# Patient Record
Sex: MALE | Race: WHITE
[De-identification: names, ages, dates, MRNs, and addresses within clinical notes are randomized per-mention and may not be internally consistent; named-entity substitution may affect disease eponyms.]

---

## 2018-10-23 LAB
BUN BLD-MCNC: 20 MG/DL (ref 7–18)
GLUCOSE SERPLBLD-MCNC: 80 MG/DL (ref 74–106)
HCT VFR BLD CALC: 41.6 % (ref 39.6–49)
INR BLD: 1.73
LYMPHOCYTES # SPEC AUTO: 2.5 K/UL (ref 0.7–4.9)
MCH RBC QN AUTO: 34.3 PG (ref 27–35)
MCV RBC: 99.3 FL (ref 80–100)
PMV BLD: 8.3 FL (ref 7.6–11.3)
POTASSIUM SERPL-SCNC: 4.2 MMOL/L (ref 3.5–5.1)
RBC # BLD: 4.19 M/UL (ref 4.33–5.43)

## 2018-10-24 ENCOUNTER — HOSPITAL ENCOUNTER (OUTPATIENT)
Dept: HOSPITAL 97 - CCL | Age: 63
Discharge: HOME | End: 2018-10-24
Attending: SPECIALIST
Payer: COMMERCIAL

## 2018-10-24 DIAGNOSIS — I48.91: Primary | ICD-10-CM

## 2018-10-24 DIAGNOSIS — I10: ICD-10-CM

## 2018-10-24 PROCEDURE — 85730 THROMBOPLASTIN TIME PARTIAL: CPT

## 2018-10-24 PROCEDURE — 85610 PROTHROMBIN TIME: CPT

## 2018-10-24 PROCEDURE — 93005 ELECTROCARDIOGRAM TRACING: CPT

## 2018-10-24 PROCEDURE — 36415 COLL VENOUS BLD VENIPUNCTURE: CPT

## 2018-10-24 PROCEDURE — 85025 COMPLETE CBC W/AUTO DIFF WBC: CPT

## 2018-10-24 PROCEDURE — 5A2204Z RESTORATION OF CARDIAC RHYTHM, SINGLE: ICD-10-PCS

## 2018-10-24 PROCEDURE — 92960 CARDIOVERSION ELECTRIC EXT: CPT

## 2018-10-24 PROCEDURE — 80048 BASIC METABOLIC PNL TOTAL CA: CPT

## 2018-10-24 NOTE — OP
Surgeon:  Luis F Tsai MD



Primary Care Physician:  Tee Stover MD



Procedure:  Direct current cardioversion.



Findings:  The patient was in AFib.  With a single shock, he reverted to sinus rhythm.



Procedure In Detail:  He was brought to the cardiac cath lab in a fasting state.  He had taken Xarelt
o more than 3 weeks.  He had taken Multaq for a week.  He was sedated with Versed.  Anterior-posterio
r paddles were applied to his chest.  A single shock was given, synchronized the QRS complex.  The rh
ythm after the shock was sinus rhythm.  No complications.





/MODL

DD:  10/24/2018 08:08:23Voice ID:  [id number]

DT:  10/24/2018 11:08:59Report ID:  [id number]

## 2018-10-24 NOTE — EKG
Test Date:    2018-10-24               Test Time:    08:06:57

Technician:   DARLIN                                   

                                                     

MEASUREMENT RESULTS:                                       

Intervals:                                           

Rate:         66                                     

TN:           190                                    

QRSD:         80                                     

QT:           458                                    

QTc:          480                                    

Axis:                                                

P:            42                                     

TN:           190                                    

QRS:          49                                     

T:            28                                     

                                                     

INTERPRETIVE STATEMENTS:                                       

                                                     

Normal sinus rhythm

Prolonged QT

Abnormal ECG

Compared to ECG 10/19/2016 08:31:04

Prolonged QT interval now present

Sinus tachycardia no longer present



Electronically Signed On 10-24-18 08:26:08 CDT by Luis F Tsai

## 2021-01-18 ENCOUNTER — HOSPITAL ENCOUNTER (EMERGENCY)
Dept: HOSPITAL 97 - ER | Age: 66
Discharge: ANOTHER HOSPITAL | End: 2021-01-18
Payer: COMMERCIAL

## 2021-01-18 VITALS — SYSTOLIC BLOOD PRESSURE: 129 MMHG | DIASTOLIC BLOOD PRESSURE: 70 MMHG

## 2021-01-18 VITALS — TEMPERATURE: 98.3 F | OXYGEN SATURATION: 98 %

## 2021-01-18 DIAGNOSIS — I10: ICD-10-CM

## 2021-01-18 DIAGNOSIS — Z20.822: ICD-10-CM

## 2021-01-18 DIAGNOSIS — G45.9: Primary | ICD-10-CM

## 2021-01-18 DIAGNOSIS — I48.91: ICD-10-CM

## 2021-01-18 DIAGNOSIS — Z79.01: ICD-10-CM

## 2021-01-18 DIAGNOSIS — R29.700: ICD-10-CM

## 2021-01-18 DIAGNOSIS — I65.22: ICD-10-CM

## 2021-01-18 LAB
BUN BLD-MCNC: 22 MG/DL (ref 7–18)
GLUCOSE SERPLBLD-MCNC: 202 MG/DL (ref 74–106)
HCT VFR BLD CALC: 44 % (ref 39.6–49)
INR BLD: 1.53
LYMPHOCYTES # SPEC AUTO: 2.8 K/UL (ref 0.7–4.9)
PMV BLD: 8.2 FL (ref 7.6–11.3)
POTASSIUM SERPL-SCNC: 3.4 MMOL/L (ref 3.5–5.1)
RBC # BLD: 4.51 M/UL (ref 4.33–5.43)

## 2021-01-18 PROCEDURE — 70450 CT HEAD/BRAIN W/O DYE: CPT

## 2021-01-18 PROCEDURE — 96365 THER/PROPH/DIAG IV INF INIT: CPT

## 2021-01-18 PROCEDURE — 84443 ASSAY THYROID STIM HORMONE: CPT

## 2021-01-18 PROCEDURE — 85610 PROTHROMBIN TIME: CPT

## 2021-01-18 PROCEDURE — 96366 THER/PROPH/DIAG IV INF ADDON: CPT

## 2021-01-18 PROCEDURE — 70496 CT ANGIOGRAPHY HEAD: CPT

## 2021-01-18 PROCEDURE — 82947 ASSAY GLUCOSE BLOOD QUANT: CPT

## 2021-01-18 PROCEDURE — 85730 THROMBOPLASTIN TIME PARTIAL: CPT

## 2021-01-18 PROCEDURE — 99284 EMERGENCY DEPT VISIT MOD MDM: CPT

## 2021-01-18 PROCEDURE — 81003 URINALYSIS AUTO W/O SCOPE: CPT

## 2021-01-18 PROCEDURE — 80048 BASIC METABOLIC PNL TOTAL CA: CPT

## 2021-01-18 PROCEDURE — 70498 CT ANGIOGRAPHY NECK: CPT

## 2021-01-18 PROCEDURE — 85025 COMPLETE CBC W/AUTO DIFF WBC: CPT

## 2021-01-18 PROCEDURE — 71045 X-RAY EXAM CHEST 1 VIEW: CPT

## 2021-01-18 PROCEDURE — 36415 COLL VENOUS BLD VENIPUNCTURE: CPT

## 2021-01-18 PROCEDURE — 93005 ELECTROCARDIOGRAM TRACING: CPT

## 2021-01-18 NOTE — ER
Nurse's Notes                                                                                     

 [facility]                                                                 

Name: Juan Lugo                                                                             

Age: 65 yrs                                                                                       

Sex: Male                                                                                         

: 1955                                                                                   

MRN: [id number]                                                                                   

Arrival Date: 2021                                                                          

Time: 15:50                                                                                       

Account#: [id number]                                                                            

Bed 8                                                                                             

Private MD:                                                                                       

Diagnosis: Transient cerebral ischemic attack, unspecified;Occlusion and stenosis of carotid      

  artery-70 % bulb on the left                                                                    

                                                                                                  

Presentation:                                                                                     

                                                                                             

15:55 Chief complaint: Patient states: R arm weakness and numbness 30 - 45 minutes PTA.       ca1 

      Symptoms resolved at this time. Had headache off and on for 2 days and visual problems.     

      HX of A-fib. Pt on Xarelto. VAN negative. Negative slurring. Negative facial droop.         

      Onset of symptoms was 2021 at 15:10.                                            

15:55 Method Of Arrival: Ambulatory                                                           ca1 

15:55 Acuity: RACHAEL 2                                                                           ca1 

15:55 An acute neurological deficit is present. Pre-hospital glucose is not applicable to     aa5 

      this patient.                                                                               

16:10 Risk Assessment: Do you want to hurt yourself or someone else? Patient reports no       aa5 

      desire to harm self or others.                                                              

16:10 Coronavirus screen: Client denies travel out of the [place] in the last 14 days. At this   aa5 

      time, the client does not indicate any symptoms associated with coronavirus-19. Ebola       

      Screen: Patient negative for fever greater than or equal to 101.5 degrees Fahrenheit,       

      and additional compatible Ebola Virus Disease symptoms. Initial Sepsis Screen: Does the     

      patient meet any 2 criteria? No. Patient's initial sepsis screen is negative. Does the      

      patient have a suspected source of infection? No. Patient's initial sepsis screen is        

      negative.                                                                                   

                                                                                                  

Stroke Activation: Symptom onset < 3 hours                                                        

 Physician: Stroke Attending; Name: ; Notified At: ; Arrived At:                                  

 Physician: Chief Stroke Resident; Name: ; Notified At: ; Arrived At:                             

 Physician: Stroke Resident; Name: ; Notified At: ; Arrived At:                                   

 Physician: ED Attending; Name: ; Notified At: ; Arrived At:                                      

 Physician: ED Resident; Name: ; Notified At: ; Arrived At:                                       

                                                                                                  

Historical:                                                                                       

- Allergies:                                                                                      

16:10 No Known Allergies;                                                                     aa5 

- Home Meds:                                                                                      

19:17 Xarelto 20 mg oral tab 1 tab once daily [Active]; Rythmol 225 mg oral tab 1 tab twice a dm5 

      day [Active]; lisinopril-hydrochlorothiazide 20-12.5 mg oral tab 1 tab once daily           

      [Active]; atorvastatin 10 mg oral tab 1 tab once daily [Active];                            

      bisoprolol-hydrochlorothiazide 10-6.25 mg oral tab 1 tab once daily [Active];               

- PMHx:                                                                                           

16:10 Atrial Fib; Hypertension;                                                               aa5 

- PSHx:                                                                                           

16:10 Knee surgery;                                                                           aa5 

                                                                                                  

- Family history:: not pertinent.                                                                 

                                                                                                  

                                                                                                  

Screenin:10 Abuse screen: Denies threats or abuse. Nutritional screening: No deficits noted.        aa5 

      Tuberculosis screening: No symptoms or risk factors identified. Fall Risk None              

      identified.                                                                                 

                                                                                                  

Assessment:                                                                                       

16:01 Reassessment: Dr. Spaulding at bedside.                                                  ca1 

16:08 Reassessment: Pt back from CT scan .                                                    aa5 

16:10 VAN Scoring: Arm Drift: Patients demonstrates NO arm weakness. Patient is VAN Negative. aa5 

      Visual Disturbance: No visual disturbance noted. Aphasia: No aphasia noted. Neglect: No     

      neglect noted.                                                                              

16:10 General: Appears comfortable, Behavior is calm, cooperative. Pain: Denies pain. Neuro:  aa5 

      Level of Consciousness is awake, alert, obeys commands, Oriented to person, place,          

      time, situation, Appropriate for age  are equal bilaterally Moves all extremities.     

      Speech is normal, Facial symmetry appears normal, Pupils are PERRLA, Denies weakness        

      blurred vision dizziness, difficulty swallowing, paresthesias numbness headache             

      diplopia. Cardiovascular: Heart tones S1 S2 present. Respiratory: Airway is patent          

      Respiratory effort is even, unlabored, Respiratory pattern is regular, symmetrical,         

      Denies cough, shortness of breath. GI: No signs and/or symptoms were reported involving     

      the gastrointestinal system. Patient currently denies diarrhea, nausea, vomiting. :       

      No signs and/or symptoms were reported regarding the genitourinary system. EENT: No         

      signs and/or symptoms were reported regarding the EENT system. Derm: Skin is pink, warm     

      \T\ dry. Musculoskeletal: Range of motion: intact in all extremities.                       

16:10 T-PA (Activase) Screening: Contraindications: Rapidly improving condition or minor      aa5 

      deficit: Yes.                                                                               

16:20 Patient has been NPO before screening. The patient is alert, and able to follow         aa5 

      commands. The patient does not exhibit slurred or garbled speech. The patient is not        

      exhibiting difficulty speaking. The patient does not exhibit difficulty understanding       

      words. The patient is able to swallow own secretions with no drooling or need for           

      suction. Patient tolerated one teaspoon of water. No drooling, immediate coughing,          

      gurgling, or clearing of the throat was noted. The patient tolerated 90mL of water. No      

      drooling, immediate coughing, gurgling, or clearing of the throat was noted. The            

      patient passed the bedside swallow screening. Oral medications may be given as ordered.     

      Contact Physician for further diet orders. Provider notified of bedside swallow             

      screening results: Yaya Spaulding MD.                                                       

17:15 Reassessment: PT RETURNED FROM CT.                                                      bp  

17:15 Reassessment: Patient appears in no apparent distress at this time. No changes from     bp  

      previously documented assessment. Patient and/or family updated on plan of care and         

      expected duration. Pain level reassessed. Patient is alert, oriented x 3, equal             

      unlabored respirations, skin warm/dry/pink.                                                 

18:20 Reassessment: Patient is alert, oriented x 3, equal unlabored respirations, skin        aa5 

      warm/dry/pink. Patient denies pain at this time.                                            

20:18 Reassessment: attempt to call report to receiving nurse, awaiting admin approval for pt sg  

      transfer, Bianca RN with [facility] requesting 10 more minutes for a call back.          

21:11 Reassessment: Patient appears in no apparent distress at this time. Patient and/or      sg  

      family updated on plan of care and expected duration. Pain level reassessed. Patient is     

      alert, oriented x 3, equal unlabored respirations, skin warm/dry/pink. no call back         

      from transfer center, attempt to call them, [place] [facility] coordinator states that      

      she is awaiting a call back from the [facility] for patient placement at this time, awaiting       

      another call back for this patient transfer.                                                

21:21 Reassessment: Patient appears in no apparent distress at this time. Patient is alert,   sg  

      oriented x 3, equal unlabored respirations, skin warm/dry/pink. pt updated on transfer      

      status, placed to a hospital bed for comfort while awaiting pt transfer at this time.       

21:26 Reassessment: orders to hld pt at this facility until [facility] has patient          sg  

      placement her Laly RN, pt to remain in the facility until instructed to call report.      

      Charge Nurse aware.                                                                         

21:40 Reassessment: Report called to Shawanda OLIVIER for patient transfer, pt signatures obtained,   sg  

      awaiting EMS transport at this time, pt stated understanding.                               

                                                                                                  

Vital Signs:                                                                                      

16:10  / 81; Pulse 67; Resp 20 S; Temp 98.3(O); Pulse Ox 98% on R/A;                    aa5 

17:00  / 68; Pulse 68; Resp 18; Pulse Ox 98% ;                                          bp  

18:00  / 80; Pulse 58; Resp 16 S; Pulse Ox 98% on R/A;                                  aa5 

19:38  / 70; Pulse 56 MON; Resp 18; Temp 98.3; Pulse Ox 98% on R/A;                     sg  

                                                                                                  

NIH Stroke Scale Scores:                                                                          

16:10 NIHSS Score: 0                                                                          aa5 

16:43 NIHSS Score: 0                                                                          malia 

17:15 NIHSS Score: 0                                                                          bp  

18:20 NIHSS Score: 0                                                                          aa5 

                                                                                                  

ED Course:                                                                                        

15:50 Patient arrived in ED.                                                                  as  

15:53 Siena Nash, RN is Primary Nurse.                                                   aa5 

15:54 Yaya Spualding MD is Attending Physician.                                             malia 

15:57 Triage completed.                                                                       ca1 

16:00 CT Stroke Brain w/o Contrast In Process Unspecified.                                    EDMS

16:01 Arm band placed on right wrist.                                                         ca1 

16:10 Patient has correct armband on for positive identification. Placed in gown. Bed in low  mh5 

      position. Call light in reach. Side rails up X 1. Warm blanket given. Cardiac monitor       

      on. Pulse ox on. NIBP on.                                                                   

16:10 EKG done, by ED staff, reviewed by Yaya Spaulding MD.                                   mh5 

16:10 Initial lab(s) drawn, by me, sent to lab. Inserted saline lock: 18 gauge in right       aa5 

      antecubital area, using aseptic technique. Blood collected.                                 

16:11 Ptt, Activated Sent.                                                                    mh5 

16:11 Protime (+inr) Sent.                                                                    mh5 

16:11 CBC with Diff Sent.                                                                     mh5 

16:11 Basic Metabolic Panel Sent.                                                             mh5 

16:23 Stroke CXR 1 View In Process Unspecified.                                               EDMS

17:01 Shawn Mark DO is Hospitalizing Provider.                                           malia 

17:12 Head Angio CT In Process Unspecified.                                                   EDMS

17:13 Neck Angio CT In Process Unspecified.                                                   EDMS

19:00 Report given to Adama Shankar RN.                                                         aa5 

19:28 COVID swab sent to lab.                                                                   

19:39 Primary Nurse role handed off by Siena Nash, RN                                    sg  

19:39 Adama Shankar, RN is Primary Nurse.                                                       sg  

                                                                                                  

Administered Medications:                                                                         

16:18 Drug: NS 0.9% 1000 ml Route: IV; Rate: 1 bolus; Site: right antecubital;                aa5 

18:32 Follow up: IV Status: Completed infusion; IV Intake: 1000ml                             bp  

16:18 Drug: foLIC Acid 1 mg Route: IVPB; Site: right antecubital;                             aa5 

18:31 Follow up: IV Status: Completed infusion; IV Intake: 100ml                              bp  

17:30 Drug: Lipitor 20 mg Route: PO;                                                          aa5 

18:31 Follow up: Response: No adverse reaction                                                bp  

18:00 Drug: NS 0.9% with KCl 20 mEq/L 1000 ml Route: IV; Rate: 125 ml/hr; Site: right         bp  

      antecubital;                                                                                

20:00 Drug: Rythmol 225 mg Route: PO;                                                         dm5 

21:00 Follow up: Response: No adverse reaction                                                sg  

                                                                                                  

                                                                                                  

Point of Care Testing:                                                                            

      Blood Glucose:                                                                              

16:10 Blood Glucose: 205 mg/dL;                                                               aa5 

      Ranges:                                                                                     

                                                                                                  

Intake:                                                                                           

18:31 IV: 100ml; Total: 100ml.                                                                bp  

18:32 IV: 1000ml; Total: 1100ml.                                                              bp  

                                                                                                  

Outcome:                                                                                          

17:06 Decision to Hospitalize by Provider.                                                    malia 

17:56 ER care complete, transfer ordered by MD.                                               malia 

22:32 Patient left the ED.                                                                      

                                                                                                  

                                                                                                  

NIH Stroke Scale - NIH Stroke Score                                                               

Date: 2021                                                                                  

Time: 16:10                                                                                       

Total Score = 0                                                                                   

  1a. Level of Consciousness (LOC) - 0(Alert)                                                     

  1b. Level of Consciousness (LOC) (Year \T\ Age) - 0(Both)                                       

  1c. LOC Commands (Open \T\ Closes Eyes/) - 0(Both)                                          

   2. Best Gaze (Lateral Gaze Paresis) - 0(Normal)                                                

   3. Visual Field Loss - 0(No visual loss)                                                       

   4. Facial Palsy - 0(Normal)                                                                    

  5a. Left Arm: Motor (10-second hold) - 0(No drift)                                              

  5b. Right Arm: Motor (10-second hold) - 0(No drift)                                             

  6a. Left Leg: Motor (5-second hold - always test supine) - 0(No drift)                          

  6b. Right Leg: Motor (5-second hold - always test supine) - 0(No drift)                         

   7. Limb Ataxia (finger/nose \T\ heel/shin - test with eyes open) - 0(Absent)                   

   8. Sensory Loss (pinprick arms/legs/face) - 0(Normal)                                          

   9. Best Language: Aphasia (description/naming/reading) - 0(No aphasia)                         

  10. Dysarthria (speech clarity - read or repeat words) - 0(Normal)                              

  11. Extinction and Inattention (visual/tactile/auditory/spatial/personal) - 0(No                

      abnormality)                                                                                

Initials: aa5                                                                                     

                                                                                                  

                                                                                                  

NIH Stroke Scale - NIH Stroke Score                                                               

Date: 2021                                                                                  

Time: 16:43                                                                                       

Total Score = 0                                                                                   

  1a. Level of Consciousness (LOC) - 0(Alert)                                                     

  1b. Level of Consciousness (LOC) (Year \T\ Age) - 0(Both)                                       

  1c. LOC Commands (Open \T\ Closes Eyes/) - 0(Both)                                          

   2. Best Gaze (Lateral Gaze Paresis) - 0(Normal)                                                

   3. Visual Field Loss - 0(No visual loss)                                                       

   4. Facial Palsy - 0(Normal)                                                                    

  5a. Left Arm: Motor (10-second hold) - 0(No drift)                                              

  5b. Right Arm: Motor (10-second hold) - 0(No drift)                                             

  6a. Left Leg: Motor (5-second hold - always test supine) - 0(No drift)                          

  6b. Right Leg: Motor (5-second hold - always test supine) - 0(No drift)                         

   7. Limb Ataxia (finger/nose \T\ heel/shin - test with eyes open) - 0(Absent)                   

   8. Sensory Loss (pinprick arms/legs/face) - 0(Normal)                                          

   9. Best Language: Aphasia (description/naming/reading) - 0(No aphasia)                         

  10. Dysarthria (speech clarity - read or repeat words) - 0(Normal)                              

  11. Extinction and Inattention (visual/tactile/auditory/spatial/personal) - 0(No                

      abnormality)                                                                                

Initials: malia                                                                                     

                                                                                                  

                                                                                                  

NIH Stroke Scale - NIH Stroke Score                                                               

Date: 2021                                                                                  

Time: 17:15                                                                                       

Total Score = 0                                                                                   

  1a. Level of Consciousness (LOC) - 0(Alert)                                                     

  1b. Level of Consciousness (LOC) (Year \T\ Age) - 0(Both)                                       

  1c. LOC Commands (Open \T\ Closes Eyes/) - 0(Both)                                          

   2. Best Gaze (Lateral Gaze Paresis) - 0(Normal)                                                

   3. Visual Field Loss - 0(No visual loss)                                                       

   4. Facial Palsy - 0(Normal)                                                                    

  5a. Left Arm: Motor (10-second hold) - 0(No drift)                                              

  5b. Right Arm: Motor (10-second hold) - 0(No drift)                                             

  6a. Left Leg: Motor (5-second hold - always test supine) - 0(No drift)                          

  6b. Right Leg: Motor (5-second hold - always test supine) - 0(No drift)                         

   7. Limb Ataxia (finger/nose \T\ heel/shin - test with eyes open) - 0(Absent)                   

   8. Sensory Loss (pinprick arms/legs/face) - 0(Normal)                                          

   9. Best Language: Aphasia (description/naming/reading) - 0(No aphasia)                         

  10. Dysarthria (speech clarity - read or repeat words) - 0(Normal)                              

  11. Extinction and Inattention (visual/tactile/auditory/spatial/personal) - 0(No                

      abnormality)                                                                                

Initials: bp                                                                                      

                                                                                                  

                                                                                                  

NIH Stroke Scale - NIH Stroke Score                                                               

Date: 2021                                                                                  

Time: 18:20                                                                                       

Total Score = 0                                                                                   

  1a. Level of Consciousness (LOC) - 0(Alert)                                                     

  1b. Level of Consciousness (LOC) (Year \T\ Age) - 0(Both)                                       

  1c. LOC Commands (Open \T\ Closes Eyes/) - 0(Both)                                          

   2. Best Gaze (Lateral Gaze Paresis) - 0(Normal)                                                

   3. Visual Field Loss - 0(No visual loss)                                                       

   4. Facial Palsy - 0(Normal)                                                                    

  5a. Left Arm: Motor (10-second hold) - 0(No drift)                                              

  5b. Right Arm: Motor (10-second hold) - 0(No drift)                                             

  6a. Left Leg: Motor (5-second hold - always test supine) - 0(No drift)                          

  6b. Right Leg: Motor (5-second hold - always test supine) - 0(No drift)                         

   7. Limb Ataxia (finger/nose \T\ heel/shin - test with eyes open) - 0(Absent)                   

   8. Sensory Loss (pinprick arms/legs/face) - 0(Normal)                                          

   9. Best Language: Aphasia (description/naming/reading) - 0(No aphasia)                         

  10. Dysarthria (speech clarity - read or repeat words) - 0(Normal)                              

  11. Extinction and Inattention (visual/tactile/auditory/spatial/personal) - 0(No                

      abnormality)                                                                                

Initials: aa5                                                                                     

                                                                                                  

Signatures:                                                                                       

Dispatcher [employer]                           Zita Harp, RN                    RN   5                                                  

Adama Shankar RN RN sg Anderson, Corey, MD MD cha Martinez, Amelia as Calderon, Audri, RN RN   5                                                  

Jovani, Shana                              [facility]                                                  

Tee Holland RN RN   bp                                                   

AcobGilda RN                        RN   ca1                                                  

                                                                                                  

Corrections: (The following items were deleted from the chart)                                    

16:01 15:55 Chief complaint: Patient states: R arm weakness and numbness 30 - 45      ca1         

      minutes PTA. Symptoms resolved at this time. Had headache off and on for                    

      several days and visual problems. HX of A-fib. Pt on Xarelto. ca1                           

19:17 16:10 Home Meds: Xarelto oral oral; aa5                                         dm5         

19:17 16:10 Home Meds: Lisinopril Oral; aa5                                           dm5         

19:17 16:10 Home Meds: Rythmol Oral; aa5                                              dm5         

                                                                                                  

**************************************************************************************************

## 2021-01-18 NOTE — RAD REPORT
EXAM DESCRIPTION:  CT - Ct Stroke Brain Wo Cont - 1/18/2021 4:01 pm

 

CLINICAL HISTORY:  Numbness/right arm weakness

 

COMPARISON:  none

 

TECHNIQUE:  Computed axial tomography of the head was obtained.

 

All CT scans are performed using dose optimization technique as appropriate and may include automated
 exposure control or mA/KV adjustment according to patient size.

 

FINDINGS:  An intracranial  bleed is not seen .

 

The ventricles are normal in caliber.

 

No extra-axial fluid collection is noted.

 

Fluid within the sinuses/ mastoids is not seen.

 

IMPRESSION:  No acute intracranial abnormality is seen. If patient's symptoms persist  MRI of the bra
in would be recommended.

 

EVELIA Mandujano of the emergency room was notified at 4:04 p.m. January 18, 2021

## 2021-01-18 NOTE — EDPHYS
Physician Documentation                                                                           

 [facility]                                                                 

Name: Juan Lugo                                                                             

Age: 65 yrs                                                                                       

Sex: Male                                                                                         

: 1955                                                                                   

MRN: [id number]                                                                                   

Arrival Date: 2021                                                                          

Time: 15:50                                                                                       

Account#: [id number]                                                                            

Bed 8                                                                                             

Private MD:                                                                                       

ED Physician Yaya Spaulding                                                                      

HPI:                                                                                              

                                                                                             

16:38 This 65 yrs old  Male presents to ER via Ambulatory with complaints of S/S of  malia 

      Possible Stroke.                                                                            

16:38 The patient's problem is reported as a facial droop, paresthesias, weakness, in the     malia 

      right upper extremity. Onset: The symptoms/episode began/occurred 2 hour(s) ago.            

      Duration: The episodes are intermittent, lasting 90 second(s). Context: the episode(s)      

      was witnessed, by no one, symptoms became apparent at 15:00. The symptoms are               

      alleviated by nothing. The symptoms are aggravated by nothing. Associated signs and         

      symptoms: The patient has no apparent associated signs or symptoms. Severity of             

      symptoms: At their worst the symptoms were mild in the emergency department the             

      symptoms have improved markedly. Patient's baseline: Neuro: alert and fully oriented.       

                                                                                                  

Historical:                                                                                       

- Allergies:                                                                                      

16:10 No Known Allergies;                                                                     aa5 

- Home Meds:                                                                                      

19:17 Xarelto 20 mg oral tab 1 tab once daily [Active]; Rythmol 225 mg oral tab 1 tab twice a dm5 

      day [Active]; lisinopril-hydrochlorothiazide 20-12.5 mg oral tab 1 tab once daily           

      [Active]; atorvastatin 10 mg oral tab 1 tab once daily [Active];                            

      bisoprolol-hydrochlorothiazide 10-6.25 mg oral tab 1 tab once daily [Active];               

- PMHx:                                                                                           

16:10 Atrial Fib; Hypertension;                                                               aa5 

- PSHx:                                                                                           

16:10 Knee surgery;                                                                           aa5 

                                                                                                  

- Family history:: not pertinent.                                                                 

                                                                                                  

                                                                                                  

ROS:                                                                                              

16:38 Constitutional: Negative for fever, chills, and weight loss, Eyes: Negative for injury, malia 

      pain, redness, and discharge, ENT: Negative for injury, pain, and discharge, Neck:          

      Negative for injury, pain, and swelling, Cardiovascular: Negative for chest pain,           

      palpitations, and edema, Respiratory: Negative for shortness of breath, cough,              

      wheezing, and pleuritic chest pain, Abdomen/GI: Negative for abdominal pain, nausea,        

      vomiting, diarrhea, and constipation, Back: Negative for injury and pain, : Negative      

      for injury, bleeding, discharge, and swelling, MS/Extremity: Negative for injury and        

      deformity, Skin: Negative for injury, rash, and discoloration, Psych: Negative for          

      depression, anxiety, suicide ideation, homicidal ideation, and hallucinations,              

      Allergy/Immunology: Negative for hives, rash, and allergies, Endocrine: Negative for        

      neck swelling, polydipsia, polyuria, polyphagia, and marked weight changes,                 

      Hematologic/Lymphatic: Negative for swollen nodes, abnormal bleeding, and unusual           

      bruising.                                                                                   

16:38 Neuro: Positive for numbness, weakness, of the left jaw, right arm weakness and             

      numbness.                                                                                   

                                                                                                  

Exam:                                                                                             

16:38 Radiologist reports: negative                                                           malia 

16:38 Constitutional:  This is a well developed, well nourished patient who is awake, alert,      

      and in no acute distress. Head/Face:  Normocephalic, atraumatic. Eyes:  Pupils equal        

      round and reactive to light, extra-ocular motions intact.  Lids and lashes normal.          

      Conjunctiva and sclera are non-icteric and not injected.  Cornea within normal limits.      

      Periorbital areas with no swelling, redness, or edema. ENT:  Nares patent. No nasal         

      discharge, no septal abnormalities noted.  Tympanic membranes are normal and external       

      auditory canals are clear.  Oropharynx with no redness, swelling, or masses, exudates,      

      or evidence of obstruction, uvula midline.  Mucous membranes moist. Neck:  Trachea          

      midline, no thyromegaly or masses palpated, and no cervical lymphadenopathy.  Supple,       

      full range of motion without nuchal rigidity, or vertebral point tenderness.  No            

      Meningismus. Chest/axilla:  Normal chest wall appearance and motion.  Nontender with no     

      deformity.  No lesions are appreciated. Cardiovascular:  Regular rate and rhythm with a     

      normal S1 and S2.  No gallops, murmurs, or rubs.  Normal PMI, no JVD.  No pulse             

      deficits. Respiratory:  Lungs have equal breath sounds bilaterally, clear to                

      auscultation and percussion.  No rales, rhonchi or wheezes noted.  No increased work of     

      breathing, no retractions or nasal flaring. Abdomen/GI:  Soft, non-tender, with normal      

      bowel sounds.  No distension or tympany.  No guarding or rebound.  No evidence of           

      tenderness throughout. Back:  No spinal tenderness.  No costovertebral tenderness.          

      Full range of motion. Male :  Normal genitalia with no discharge or lesions. Skin:        

      Warm, dry with normal turgor.  Normal color with no rashes, no lesions, and no evidence     

      of cellulitis. MS/ Extremity:  Pulses equal, no cyanosis.  Neurovascular intact.  Full,     

      normal range of motion. Neuro:  Awake and alert, GCS 15, oriented to person, place,         

      time, and situation.  Cranial nerves II-XII grossly intact.  Motor strength 5/5 in all      

      extremities.  Sensory grossly intact.  Cerebellar exam normal.  Normal gait. Psych:         

      Awake, alert, with orientation to person, place and time.  Behavior, mood, and affect       

      are within normal limits.                                                                   

16:43 ECG was reviewed by the Attending Physician.                                            [facility] 

                                                                                                  

Vital Signs:                                                                                      

16:10  / 81; Pulse 67; Resp 20 S; Temp 98.3(O); Pulse Ox 98% on R/A;                    aa5 

17:00  / 68; Pulse 68; Resp 18; Pulse Ox 98% ;                                          bp  

18:00  / 80; Pulse 58; Resp 16 S; Pulse Ox 98% on R/A;                                  aa5 

19:38  / 70; Pulse 56 MON; Resp 18; Temp 98.3; Pulse Ox 98% on R/A;                     sg  

                                                                                                  

NIH Stroke Scale Scores:                                                                          

16:10 NIHSS Score: 0                                                                          aa5 

16:43 NIHSS Score: 0                                                                          malia 

17:15 NIHSS Score: 0                                                                          bp  

18:20 NIHSS Score: 0                                                                          aa5 

                                                                                                  

MDM:                                                                                              

15:54 Patient medically screened.                                                             malia 

16:42 Differential diagnosis: CVA, TIA, Dementia, metabolic disorder. Data reviewed: vital    malia 

      signs, nurses notes. Data interpreted: Cardiac monitor: rate is 67 beats/min, rhythm is     

      normal sinus rhythm. Test interpretation: by ED physician or midlevel provider: ECG,        

      plain radiologic studies. Counseling: I had a detailed discussion with the patient          

      and/or guardian regarding: the historical points, exam findings, and any diagnostic         

      results supporting the discharge/admit diagnosis, lab results, radiology results.           

      Physician consultation: Moises Martinez MD regarding if ct angio head and neck negative     

      keep here.                                                                                  

                                                                                                  

                                                                                             

15:56 Order name: Basic Metabolic Panel; Complete Time: 16:37                                 aa5 

                                                                                             

15:56 Order name: CBC with Diff; Complete Time: 17:45                                         aa5 

                                                                                             

15:56 Order name: Protime (+inr); Complete Time: 17:45                                                                                                                                     

15:56 Order name: Ptt, Activated; Complete Time: 17:45                                                                                                                                     

16:13 Order name: TSH; Complete Time: 17:45                                                   malia 

                                                                                             

16:21 Order name: Glucose, Ancillary Testing; Complete Time: 16:34                            EDMS

                                                                                             

15:54 Order name: CT Stroke Brain w/o Contrast; Complete Time: 16:34                          ca1 

                                                                                             

15:56 Order name: Stroke CXR 1 View; Complete Time: 17:45                                     aa5 

                                                                                             

16:14 Order name: Head Angio CT; Complete Time: 19:12                                         bd  

                                                                                             

16:14 Order name: Neck Angio CT; Complete Time: 19:12                                         bd  

                                                                                             

19:04 Order name: Urine Dipstick--Ancillary (enter results)                                   tt3 

                                                                                             

20:15 Order name: SARS-COV-2 RT PCR                                                           EDMS

                                                                                             

15:56 Order name: EKG; Complete Time: 15:56                                                                                                                                                

15:56 Order name: Accucheck; Complete Time: 16:09                                                                                                                                          

15:56 Order name: Cardiac monitoring; Complete Time: 16:09                                                                                                                                 

15:56 Order name: EKG - Nurse/Tech; Complete Time: 16:09                                                                                                                                   

15:56 Order name: IV Saline Lock; Complete Time: 16:09                                                                                                                                     

15:56 Order name: Labs collected and sent; Complete Time: 16:10                                                                                                                            

15:56 Order name: NPO; Complete Time: 16:10                                                                                                                                                

15:56 Order name: O2 Per Protocol; Complete Time: 16:10                                                                                                                                    

15:56 Order name: O2 Sat Monitoring; Complete Time: 16:10                                                                                                                                  

15:56 Order name: Stroke Swallow Screen; Complete Time: 16:10                                                                                                                              

16:13 Order name: Urine Dipstick-Ancillary (obtain specimen); Complete Time: 19:08            [facility] 

                                                                                                  

EC:43 Rate is 75 beats/min. Rhythm is regular. QRS Axis is Normal. IA interval is normal. QRS malia 

      interval is normal. QT interval is normal. No Q waves. T waves are Normal. No ST            

      changes noted. Clinical impression: Normal ECG and No evidence of ischemia. Interpreted     

      by me. Reviewed by me.                                                                      

                                                                                                  

Administered Medications:                                                                         

16:18 Drug: NS 0.9% 1000 ml Route: IV; Rate: 1 bolus; Site: right antecubital;                aa5 

18:32 Follow up: IV Status: Completed infusion; IV Intake: 1000ml                             bp  

16:18 Drug: foLIC Acid 1 mg Route: IVPB; Site: right antecubital;                             aa5 

18:31 Follow up: IV Status: Completed infusion; IV Intake: 100ml                              bp  

17:30 Drug: Lipitor 20 mg Route: PO;                                                          aa5 

18:31 Follow up: Response: No adverse reaction                                                bp  

18:00 Drug: NS 0.9% with KCl 20 mEq/L 1000 ml Route: IV; Rate: 125 ml/hr; Site: right         bp  

      antecubital;                                                                                

20:00 Drug: Rythmol 225 mg Route: PO;                                                         dm5 

21:00 Follow up: Response: No adverse reaction                                                sg  

                                                                                                  

                                                                                                  

Point of Care Testing:                                                                            

      Blood Glucose:                                                                              

16:10 Blood Glucose: 205 mg/dL;                                                               aa5 

      Ranges:                                                                                     

      Critical Glucose Levels:Adult <50 mg/dl or >400 mg/dl  <40 mg/dl or >180 mg/dl       

Disposition:                                                                                      

21 17:56 Transfer ordered to [facility]. Diagnosis are           

  Transient cerebral ischemic attack, unspecified, Occlusion and stenosis of                      

  carotid artery - 70 % bulb on the left.                                                         

- Reason for transfer: Higher level of care.                                                      

- Accepting physician is to [facility], neuro.                                                           

- Condition is Fair.                                                                              

- Problem is new.                                                                                 

- Symptoms have improved.                                                                         

                                                                                                  

                                                                                                  

                                                                                                  

                                                                                                  

NIH Stroke Scale - NIH Stroke Score                                                               

Date: 2021                                                                                  

Time: 16:10                                                                                       

Total Score = 0                                                                                   

  1a. Level of Consciousness (LOC) - 0(Alert)                                                     

  1b. Level of Consciousness (LOC) (Year \T\ Age) - 0(Both)                                       

  1c. LOC Commands (Open \T\ Closes Eyes/) - 0(Both)                                          

   2. Best Gaze (Lateral Gaze Paresis) - 0(Normal)                                                

   3. Visual Field Loss - 0(No visual loss)                                                       

   4. Facial Palsy - 0(Normal)                                                                    

  5a. Left Arm: Motor (10-second hold) - 0(No drift)                                              

  5b. Right Arm: Motor (10-second hold) - 0(No drift)                                             

  6a. Left Leg: Motor (5-second hold - always test supine) - 0(No drift)                          

  6b. Right Leg: Motor (5-second hold - always test supine) - 0(No drift)                         

   7. Limb Ataxia (finger/nose \T\ heel/shin - test with eyes open) - 0(Absent)                   

   8. Sensory Loss (pinprick arms/legs/face) - 0(Normal)                                          

   9. Best Language: Aphasia (description/naming/reading) - 0(No aphasia)                         

  10. Dysarthria (speech clarity - read or repeat words) - 0(Normal)                              

  11. Extinction and Inattention (visual/tactile/auditory/spatial/personal) - 0(No                

      abnormality)                                                                                

Initials: aa5                                                                                     

                                                                                                  

                                                                                                  

NIH Stroke Scale - NIH Stroke Score                                                               

Date: 2021                                                                                  

Time: 16:43                                                                                       

Total Score = 0                                                                                   

  1a. Level of Consciousness (LOC) - 0(Alert)                                                     

  1b. Level of Consciousness (LOC) (Year \T\ Age) - 0(Both)                                       

  1c. LOC Commands (Open \T\ Closes Eyes/) - 0(Both)                                          

   2. Best Gaze (Lateral Gaze Paresis) - 0(Normal)                                                

   3. Visual Field Loss - 0(No visual loss)                                                       

   4. Facial Palsy - 0(Normal)                                                                    

  5a. Left Arm: Motor (10-second hold) - 0(No drift)                                              

  5b. Right Arm: Motor (10-second hold) - 0(No drift)                                             

  6a. Left Leg: Motor (5-second hold - always test supine) - 0(No drift)                          

  6b. Right Leg: Motor (5-second hold - always test supine) - 0(No drift)                         

   7. Limb Ataxia (finger/nose \T\ heel/shin - test with eyes open) - 0(Absent)                   

   8. Sensory Loss (pinprick arms/legs/face) - 0(Normal)                                          

   9. Best Language: Aphasia (description/naming/reading) - 0(No aphasia)                         

  10. Dysarthria (speech clarity - read or repeat words) - 0(Normal)                              

  11. Extinction and Inattention (visual/tactile/auditory/spatial/personal) - 0(No                

      abnormality)                                                                                

Initials: malia                                                                                     

                                                                                                  

                                                                                                  

NIH Stroke Scale - NIH Stroke Score                                                               

Date: 2021                                                                                  

Time: 17:15                                                                                       

Total Score = 0                                                                                   

  1a. Level of Consciousness (LOC) - 0(Alert)                                                     

  1b. Level of Consciousness (LOC) (Year \T\ Age) - 0(Both)                                       

  1c. LOC Commands (Open \T\ Closes Eyes/) - 0(Both)                                          

   2. Best Gaze (Lateral Gaze Paresis) - 0(Normal)                                                

   3. Visual Field Loss - 0(No visual loss)                                                       

   4. Facial Palsy - 0(Normal)                                                                    

  5a. Left Arm: Motor (10-second hold) - 0(No drift)                                              

  5b. Right Arm: Motor (10-second hold) - 0(No drift)                                             

  6a. Left Leg: Motor (5-second hold - always test supine) - 0(No drift)                          

  6b. Right Leg: Motor (5-second hold - always test supine) - 0(No drift)                         

   7. Limb Ataxia (finger/nose \T\ heel/shin - test with eyes open) - 0(Absent)                   

   8. Sensory Loss (pinprick arms/legs/face) - 0(Normal)                                          

   9. Best Language: Aphasia (description/naming/reading) - 0(No aphasia)                         

  10. Dysarthria (speech clarity - read or repeat words) - 0(Normal)                              

  11. Extinction and Inattention (visual/tactile/auditory/spatial/personal) - 0(No                

      abnormality)                                                                                

Initials: bp                                                                                      

                                                                                                  

                                                                                                  

NIH Stroke Scale - NIH Stroke Score                                                               

Date: 2021                                                                                  

Time: 18:20                                                                                       

Total Score = 0                                                                                   

  1a. Level of Consciousness (LOC) - 0(Alert)                                                     

  1b. Level of Consciousness (LOC) (Year \T\ Age) - 0(Both)                                       

  1c. LOC Commands (Open \T\ Closes Eyes/) - 0(Both)                                          

   2. Best Gaze (Lateral Gaze Paresis) - 0(Normal)                                                

   3. Visual Field Loss - 0(No visual loss)                                                       

   4. Facial Palsy - 0(Normal)                                                                    

  5a. Left Arm: Motor (10-second hold) - 0(No drift)                                              

  5b. Right Arm: Motor (10-second hold) - 0(No drift)                                             

  6a. Left Leg: Motor (5-second hold - always test supine) - 0(No drift)                          

  6b. Right Leg: Motor (5-second hold - always test supine) - 0(No drift)                         

   7. Limb Ataxia (finger/nose \T\ heel/shin - test with eyes open) - 0(Absent)                   

   8. Sensory Loss (pinprick arms/legs/face) - 0(Normal)                                          

   9. Best Language: Aphasia (description/naming/reading) - 0(No aphasia)                         

  10. Dysarthria (speech clarity - read or repeat words) - 0(Normal)                              

  11. Extinction and Inattention (visual/tactile/auditory/spatial/personal) - 0(No                

      abnormality)                                                                                

Initials: [email or web address]                                                                                     

                                                                                                  

Signatures:                                                                                       

Dispatcher [employer]                           EDMS                                                 

Zita Armenta, RN                    RN   5                                                  

Adama Shankar RN                         RN   Yaya Balderas MD MD cha Calderon, Audri, RN                     RN   aa5                                                  

Tee Holland RN                      RN   bp                                                   

                                                                                                  

Corrections: (The following items were deleted from the chart)                                    

17:51 17:06 Hospitalization Ordered by Shawn Mark DO for Inpatient Admission.      [facility]         

      Preliminary diagnosis is Transient cerebral ischemic attack, unspecified -                  

      right face and right arm weakness; Aphasia. Bed requested for Telemetry/MedSurg             

      (Inpatient). Status is Inpatient Admission. Condition is Fair. Problem is new.              

      Symptoms have improved. [facility]                                                                 

19:17 16:10 Home Meds: Xarelto oral oral; teo [email or web address]         

19:17 16:10 Home Meds: Lisinopril Oral; teo garcia         

19:17 16:10 Home Meds: Rythmol Oral; [place] [id number]         

19:27 19:14 CORONAVIRUS+MR.LAB.CLAUDIOZ ordered. [facility]                                      EDMS        

22:32 17:56 2021 17:56 Transfer ordered to [facility]. Diagnosis is Transient cerebral ischemic attack, unspecified; Occlusion             

      and stenosis of carotid artery - 70 % bulb on the left. Reason for transfer:                

      Higher level of care. Accepting physician is to [facility], neuro. Condition is Fair.              

      Problem is new. Symptoms have improved. malia                                                 

                                                                                                  

**************************************************************************************************

## 2021-01-18 NOTE — RAD REPORT
EXAM DESCRIPTION:  Gerda Single View1/18/2021 4:20 pm

 

CLINICAL HISTORY:  Hypertension

 

COMPARISON:  none

 

FINDINGS:   The lungs appear clear of acute infiltrate. The heart is normal size

 

IMPRESSION:   No acute abnormalities displayed

## 2021-01-18 NOTE — RAD REPORT
EXAM DESCRIPTION:  CTHead angio1/18/2021 5:12 pm

 

CLINICAL HISTORY:  numbness

 

COMPARISON:  None

 

TECHNIQUE:  CT angiogram of the head was obtained. 3D MIPS reconstruction performed.

 

All CT scans are performed using dose optimization technique as appropriate and may include automated
 exposure control or mA/KV adjustment according to patient size.

 

FINDINGS:  The basilar, internal carotid, anterior cerebral, middle cerebral and posterior cerebral a
rteries are normal caliber. An aneurysm is not seen.

 

A significant stenosis is not noted.

 

IMPRESSION:  Unremarkable CT angiogram head.

## 2021-01-18 NOTE — RAD REPORT
EXAM DESCRIPTION:  Iwona Angio1/18/2021 5:12 pm

 

CLINICAL HISTORY:  Numbness/right-sided arm weakness

 

COMPARISON:  None

 

TECHNIQUE:  50 cc Isovue 370 was administered intravenously. 3D MIP reconstruction performed

 

All CT scans are performed using dose optimization technique as appropriate and may include automated
 exposure control or mA/KV adjustment according to patient size.

 

FINDINGS:   Plaque is present within the left carotid bulb. There is narrowing approximately 70%.

 

The right and left common carotid, right and left external carotid and right internal carotid arterie
s appear unremarkable.

 

The left vertebral artery is more dominant than the right. No abnormality noted

 

IMPRESSION:  Severe stenosis left carotid bulb estimated approximately 70%

 

NASCET criteria used.

 

Mild  0-49% stenosis

Moderate 50-69% stenosis

Severe 70-99% stenosis

## 2021-01-19 NOTE — EKG
Test Date:    2021-01-18               Test Time:    16:02:06

Technician:   MIRYAM                                    

                                                     

MEASUREMENT RESULTS:                                       

Intervals:                                           

Rate:         75                                     

ME:           184                                    

QRSD:         94                                     

QT:           402                                    

QTc:          448                                    

Axis:                                                

P:            54                                     

ME:           184                                    

QRS:          48                                     

T:            37                                     

                                                     

INTERPRETIVE STATEMENTS:                                       

                                                     

Normal sinus rhythm

Normal ECG

Compared to ECG 10/24/2018 08:06:57

Prolonged QT interval no longer present



Electronically Signed On 01-19-21 17:13:01 CST by Thomas Veliz

## 2021-06-24 LAB
BUN BLD-MCNC: 22 MG/DL (ref 7–18)
GLUCOSE SERPLBLD-MCNC: 119 MG/DL (ref 74–106)
HCT VFR BLD CALC: 44.6 % (ref 39.6–49)
INR BLD: 1.23
LYMPHOCYTES # SPEC AUTO: 2.4 K/UL (ref 0.7–4.9)
PMV BLD: 7.9 FL (ref 7.6–11.3)
POTASSIUM SERPL-SCNC: 4.2 MMOL/L (ref 3.5–5.1)
RBC # BLD: 4.56 M/UL (ref 4.33–5.43)

## 2021-06-25 NOTE — EKG
Test Date:    2021-06-24               Test Time:    11:57:35

Technician:   VINICIUS                                     

                                                     

MEASUREMENT RESULTS:                                       

Intervals:                                           

Rate:         80                                     

AK:                                                  

QRSD:         92                                     

QT:           408                                    

QTc:          470                                    

Axis:                                                

P:                                                   

AK:                                                  

QRS:          57                                     

T:            36                                     

                                                     

INTERPRETIVE STATEMENTS:                                       

                                                     

Atrial fibrillation

Abnormal ECG

Compared to ECG 01/18/2021 16:02:06

Sinus rhythm no longer present



Electronically Signed On 06-25-21 15:56:12 CDT by Thomas Veliz

## 2021-06-28 ENCOUNTER — HOSPITAL ENCOUNTER (OUTPATIENT)
Dept: HOSPITAL 97 - CCL | Age: 66
Discharge: HOME | End: 2021-06-28
Attending: INTERNAL MEDICINE
Payer: COMMERCIAL

## 2021-06-28 VITALS — OXYGEN SATURATION: 95 %

## 2021-06-28 VITALS — TEMPERATURE: 97 F | DIASTOLIC BLOOD PRESSURE: 76 MMHG | SYSTOLIC BLOOD PRESSURE: 124 MMHG

## 2021-06-28 DIAGNOSIS — E78.2: ICD-10-CM

## 2021-06-28 DIAGNOSIS — I10: ICD-10-CM

## 2021-06-28 DIAGNOSIS — I48.0: Primary | ICD-10-CM

## 2021-06-28 DIAGNOSIS — Z79.01: ICD-10-CM

## 2021-06-28 PROCEDURE — 93005 ELECTROCARDIOGRAM TRACING: CPT

## 2021-06-28 PROCEDURE — 85025 COMPLETE CBC W/AUTO DIFF WBC: CPT

## 2021-06-28 PROCEDURE — 92960 CARDIOVERSION ELECTRIC EXT: CPT

## 2021-06-28 PROCEDURE — 36415 COLL VENOUS BLD VENIPUNCTURE: CPT

## 2021-06-28 PROCEDURE — 80048 BASIC METABOLIC PNL TOTAL CA: CPT

## 2021-06-28 PROCEDURE — 85730 THROMBOPLASTIN TIME PARTIAL: CPT

## 2021-06-28 PROCEDURE — 85610 PROTHROMBIN TIME: CPT

## 2021-06-28 NOTE — OP
Date of Procedure:  06/28/2021



Surgeon:  Thomas Veliz MD



Assistant:  Ms. Batool Anglin. 



The patient will go home when he wakes up and we will see him in the office in the next week or two.



Procedure Performed:  Cardioversion.



Indication For Cardioversion:  Recurrent atrial fibrillation, on propafenone and Eliquis.



History Of Present Illness:  Mr. Valencia is 65, has a history of hypertension; dyslipidemia; paroxysm
al atrial fibrillation unresponsive to Rythmol with rapid ventricular response and symptoms.



Description Of Procedure:  Brought to the cath lab today as an outpatient.  He received a total of 7 
mg of IV push Versed for total sedation.  He received 1 shock of 200 joules and he converted to sinus
 rhythm.  There were no complications or blood loss.



Postoperative Diagnosis:  Status post successful cardioversion of atrial fibrillation to sinus rhythm
. 



We will continue his home medication.



Anesthesia:  Total conscious sedation was 30 minutes.





NB/LOUISE

DD:  06/28/2021 07:38:10Voice ID:  [id number]

DT:  06/28/2021 13:46:56Report ID:  [id number]

## 2021-06-29 NOTE — EKG
Test Date:    2021-06-28               Test Time:    06:45:36

Technician:                                      

                                                     

MEASUREMENT RESULTS:                                       

Intervals:                                           

Rate:         61                                     

WI:           196                                    

QRSD:         98                                     

QT:           456                                    

QTc:          459                                    

Axis:                                                

P:            21                                     

WI:           196                                    

QRS:          38                                     

T:            15                                     

                                                     

INTERPRETIVE STATEMENTS:                                       

                                                     

Normal sinus rhythm

Possible Inferior infarct, age undetermined

Abnormal ECG

Compared to ECG 06/24/2021 11:57:35

Myocardial infarct finding now present

Atrial fibrillation no longer present



Electronically Signed On 06-29-21 12:54:11 CDT by Thomas Veliz

## 2025-05-14 LAB
ANION GAP SERPL CALC-SCNC: 5.7 MEQ/L (ref 5–15)
HCT VFR BLD CALC: 43.4 % (ref 39.6–49)
HGB BLD-MCNC: 15.5 G/DL (ref 13.6–17.9)
LYMPHOCYTES # SPEC AUTO: 2.3 K/UL (ref 0.7–4.9)
MCH RBC QN AUTO: 34.3 PG (ref 27–35)
MCHC RBC AUTO-ENTMCNC: 35.6 G/DL (ref 32–36)
MCV RBC: 96.1 FL (ref 80–100)
PMV BLD: 7.4 FL (ref 7.6–11.3)
POTASSIUM SERPL-SCNC: 4.7 MEQ/L (ref 3.5–5.1)
RBC # BLD: 4.51 M/UL (ref 4.33–5.43)

## 2025-05-15 ENCOUNTER — HOSPITAL ENCOUNTER (OUTPATIENT)
Dept: HOSPITAL 97 - OR | Age: 70
Discharge: HOME | End: 2025-05-15
Attending: SURGERY
Payer: COMMERCIAL

## 2025-05-15 VITALS — SYSTOLIC BLOOD PRESSURE: 137 MMHG | DIASTOLIC BLOOD PRESSURE: 74 MMHG | OXYGEN SATURATION: 99 %

## 2025-05-15 VITALS — TEMPERATURE: 97.6 F

## 2025-05-15 DIAGNOSIS — K64.8: ICD-10-CM

## 2025-05-15 DIAGNOSIS — D12.0: ICD-10-CM

## 2025-05-15 DIAGNOSIS — K57.30: ICD-10-CM

## 2025-05-15 DIAGNOSIS — D12.4: ICD-10-CM

## 2025-05-15 DIAGNOSIS — D12.2: ICD-10-CM

## 2025-05-15 DIAGNOSIS — D12.3: ICD-10-CM

## 2025-05-15 DIAGNOSIS — Z12.11: Primary | ICD-10-CM

## 2025-05-15 DIAGNOSIS — N42.9: ICD-10-CM

## 2025-05-15 PROCEDURE — 0DBH8ZX EXCISION OF CECUM, VIA NATURAL OR ARTIFICIAL OPENING ENDOSCOPIC, DIAGNOSTIC: ICD-10-PCS

## 2025-05-15 PROCEDURE — 80048 BASIC METABOLIC PNL TOTAL CA: CPT

## 2025-05-15 PROCEDURE — 85025 COMPLETE CBC W/AUTO DIFF WBC: CPT

## 2025-05-15 PROCEDURE — 36415 COLL VENOUS BLD VENIPUNCTURE: CPT

## 2025-05-15 PROCEDURE — 93005 ELECTROCARDIOGRAM TRACING: CPT

## 2025-05-15 PROCEDURE — 0DBM8ZX EXCISION OF DESCENDING COLON, VIA NATURAL OR ARTIFICIAL OPENING ENDOSCOPIC, DIAGNOSTIC: ICD-10-PCS

## 2025-05-15 PROCEDURE — 45385 COLONOSCOPY W/LESION REMOVAL: CPT

## 2025-05-15 PROCEDURE — 88305 TISSUE EXAM BY PATHOLOGIST: CPT

## 2025-05-15 PROCEDURE — 0DBK8ZX EXCISION OF ASCENDING COLON, VIA NATURAL OR ARTIFICIAL OPENING ENDOSCOPIC, DIAGNOSTIC: ICD-10-PCS

## 2025-05-15 RX ADMIN — SODIUM CHLORIDE, SODIUM LACTATE, POTASSIUM CHLORIDE, AND CALCIUM CHLORIDE ONE MLS: .6; .31; .03; .02 INJECTION, SOLUTION INTRAVENOUS at 10:48
